# Patient Record
Sex: MALE | Race: WHITE | NOT HISPANIC OR LATINO | URBAN - METROPOLITAN AREA
[De-identification: names, ages, dates, MRNs, and addresses within clinical notes are randomized per-mention and may not be internally consistent; named-entity substitution may affect disease eponyms.]

---

## 2021-11-14 ENCOUNTER — EMERGENCY (EMERGENCY)
Facility: HOSPITAL | Age: 51
LOS: 1 days | Discharge: HOME | End: 2021-11-14
Attending: EMERGENCY MEDICINE | Admitting: EMERGENCY MEDICINE
Payer: COMMERCIAL

## 2021-11-14 VITALS
HEART RATE: 82 BPM | TEMPERATURE: 98 F | SYSTOLIC BLOOD PRESSURE: 177 MMHG | DIASTOLIC BLOOD PRESSURE: 97 MMHG | HEIGHT: 75 IN | RESPIRATION RATE: 20 BRPM | WEIGHT: 220.02 LBS | OXYGEN SATURATION: 99 %

## 2021-11-14 VITALS
HEART RATE: 80 BPM | TEMPERATURE: 98 F | OXYGEN SATURATION: 99 % | DIASTOLIC BLOOD PRESSURE: 80 MMHG | SYSTOLIC BLOOD PRESSURE: 128 MMHG | RESPIRATION RATE: 18 BRPM

## 2021-11-14 DIAGNOSIS — Z20.822 CONTACT WITH AND (SUSPECTED) EXPOSURE TO COVID-19: ICD-10-CM

## 2021-11-14 DIAGNOSIS — N13.2 HYDRONEPHROSIS WITH RENAL AND URETERAL CALCULOUS OBSTRUCTION: ICD-10-CM

## 2021-11-14 DIAGNOSIS — R10.9 UNSPECIFIED ABDOMINAL PAIN: ICD-10-CM

## 2021-11-14 DIAGNOSIS — R11.2 NAUSEA WITH VOMITING, UNSPECIFIED: ICD-10-CM

## 2021-11-14 LAB
ALBUMIN SERPL ELPH-MCNC: 4.7 G/DL — SIGNIFICANT CHANGE UP (ref 3.3–5)
ALP SERPL-CCNC: 84 U/L — SIGNIFICANT CHANGE UP (ref 40–120)
ALT FLD-CCNC: 33 U/L — SIGNIFICANT CHANGE UP (ref 10–45)
ANION GAP SERPL CALC-SCNC: 12 MMOL/L — SIGNIFICANT CHANGE UP (ref 5–17)
APPEARANCE UR: CLEAR — SIGNIFICANT CHANGE UP
AST SERPL-CCNC: 20 U/L — SIGNIFICANT CHANGE UP (ref 10–40)
BASOPHILS # BLD AUTO: 0.02 K/UL — SIGNIFICANT CHANGE UP (ref 0–0.2)
BASOPHILS NFR BLD AUTO: 0.2 % — SIGNIFICANT CHANGE UP (ref 0–2)
BILIRUB SERPL-MCNC: 0.3 MG/DL — SIGNIFICANT CHANGE UP (ref 0.2–1.2)
BILIRUB UR-MCNC: ABNORMAL
BUN SERPL-MCNC: 13 MG/DL — SIGNIFICANT CHANGE UP (ref 7–23)
CALCIUM SERPL-MCNC: 9.7 MG/DL — SIGNIFICANT CHANGE UP (ref 8.4–10.5)
CHLORIDE SERPL-SCNC: 110 MMOL/L — HIGH (ref 96–108)
CO2 SERPL-SCNC: 20 MMOL/L — LOW (ref 22–31)
COLOR SPEC: YELLOW — SIGNIFICANT CHANGE UP
CREAT SERPL-MCNC: 1.18 MG/DL — SIGNIFICANT CHANGE UP (ref 0.5–1.3)
DIFF PNL FLD: ABNORMAL
EOSINOPHIL # BLD AUTO: 0.01 K/UL — SIGNIFICANT CHANGE UP (ref 0–0.5)
EOSINOPHIL NFR BLD AUTO: 0.1 % — SIGNIFICANT CHANGE UP (ref 0–6)
GLUCOSE SERPL-MCNC: 149 MG/DL — HIGH (ref 70–99)
GLUCOSE UR QL: NEGATIVE — SIGNIFICANT CHANGE UP
HCT VFR BLD CALC: 48.2 % — SIGNIFICANT CHANGE UP (ref 39–50)
HGB BLD-MCNC: 16.9 G/DL — SIGNIFICANT CHANGE UP (ref 13–17)
IMM GRANULOCYTES NFR BLD AUTO: 0.4 % — SIGNIFICANT CHANGE UP (ref 0–1.5)
KETONES UR-MCNC: NEGATIVE — SIGNIFICANT CHANGE UP
LEUKOCYTE ESTERASE UR-ACNC: NEGATIVE — SIGNIFICANT CHANGE UP
LIDOCAIN IGE QN: 37 U/L — SIGNIFICANT CHANGE UP (ref 7–60)
LYMPHOCYTES # BLD AUTO: 0.73 K/UL — LOW (ref 1–3.3)
LYMPHOCYTES # BLD AUTO: 5.9 % — LOW (ref 13–44)
MCHC RBC-ENTMCNC: 31.5 PG — SIGNIFICANT CHANGE UP (ref 27–34)
MCHC RBC-ENTMCNC: 35.1 GM/DL — SIGNIFICANT CHANGE UP (ref 32–36)
MCV RBC AUTO: 89.8 FL — SIGNIFICANT CHANGE UP (ref 80–100)
MONOCYTES # BLD AUTO: 0.24 K/UL — SIGNIFICANT CHANGE UP (ref 0–0.9)
MONOCYTES NFR BLD AUTO: 1.9 % — LOW (ref 2–14)
NEUTROPHILS # BLD AUTO: 11.29 K/UL — HIGH (ref 1.8–7.4)
NEUTROPHILS NFR BLD AUTO: 91.5 % — HIGH (ref 43–77)
NITRITE UR-MCNC: NEGATIVE — SIGNIFICANT CHANGE UP
NRBC # BLD: 0 /100 WBCS — SIGNIFICANT CHANGE UP (ref 0–0)
PH UR: 7 — SIGNIFICANT CHANGE UP (ref 5–8)
PLATELET # BLD AUTO: 295 K/UL — SIGNIFICANT CHANGE UP (ref 150–400)
POTASSIUM SERPL-MCNC: 4.8 MMOL/L — SIGNIFICANT CHANGE UP (ref 3.5–5.3)
POTASSIUM SERPL-SCNC: 4.8 MMOL/L — SIGNIFICANT CHANGE UP (ref 3.5–5.3)
PROT SERPL-MCNC: 7.9 G/DL — SIGNIFICANT CHANGE UP (ref 6–8.3)
PROT UR-MCNC: 30 MG/DL
RBC # BLD: 5.37 M/UL — SIGNIFICANT CHANGE UP (ref 4.2–5.8)
RBC # FLD: 13.3 % — SIGNIFICANT CHANGE UP (ref 10.3–14.5)
SARS-COV-2 RNA SPEC QL NAA+PROBE: NEGATIVE — SIGNIFICANT CHANGE UP
SODIUM SERPL-SCNC: 142 MMOL/L — SIGNIFICANT CHANGE UP (ref 135–145)
SP GR SPEC: 1.01 — SIGNIFICANT CHANGE UP (ref 1–1.03)
UROBILINOGEN FLD QL: 0.2 E.U./DL — SIGNIFICANT CHANGE UP
WBC # BLD: 12.34 K/UL — HIGH (ref 3.8–10.5)
WBC # FLD AUTO: 12.34 K/UL — HIGH (ref 3.8–10.5)

## 2021-11-14 PROCEDURE — 96374 THER/PROPH/DIAG INJ IV PUSH: CPT | Mod: XU

## 2021-11-14 PROCEDURE — 99285 EMERGENCY DEPT VISIT HI MDM: CPT

## 2021-11-14 PROCEDURE — 87635 SARS-COV-2 COVID-19 AMP PRB: CPT

## 2021-11-14 PROCEDURE — 36415 COLL VENOUS BLD VENIPUNCTURE: CPT

## 2021-11-14 PROCEDURE — 83690 ASSAY OF LIPASE: CPT

## 2021-11-14 PROCEDURE — 80053 COMPREHEN METABOLIC PANEL: CPT

## 2021-11-14 PROCEDURE — 81001 URINALYSIS AUTO W/SCOPE: CPT

## 2021-11-14 PROCEDURE — 85025 COMPLETE CBC W/AUTO DIFF WBC: CPT

## 2021-11-14 PROCEDURE — G1004: CPT

## 2021-11-14 PROCEDURE — 74177 CT ABD & PELVIS W/CONTRAST: CPT | Mod: 26,ME

## 2021-11-14 PROCEDURE — 96375 TX/PRO/DX INJ NEW DRUG ADDON: CPT

## 2021-11-14 PROCEDURE — 99284 EMERGENCY DEPT VISIT MOD MDM: CPT | Mod: 25

## 2021-11-14 PROCEDURE — 74177 CT ABD & PELVIS W/CONTRAST: CPT | Mod: ME

## 2021-11-14 RX ORDER — IBUPROFEN 200 MG
1 TABLET ORAL
Qty: 30 | Refills: 0
Start: 2021-11-14

## 2021-11-14 RX ORDER — KETOROLAC TROMETHAMINE 30 MG/ML
15 SYRINGE (ML) INJECTION ONCE
Refills: 0 | Status: DISCONTINUED | OUTPATIENT
Start: 2021-11-14 | End: 2021-11-14

## 2021-11-14 RX ORDER — DIPHENHYDRAMINE HCL 50 MG
25 CAPSULE ORAL ONCE
Refills: 0 | Status: COMPLETED | OUTPATIENT
Start: 2021-11-14 | End: 2021-11-14

## 2021-11-14 RX ORDER — METOCLOPRAMIDE HCL 10 MG
10 TABLET ORAL ONCE
Refills: 0 | Status: COMPLETED | OUTPATIENT
Start: 2021-11-14 | End: 2021-11-14

## 2021-11-14 RX ORDER — ONDANSETRON 8 MG/1
1 TABLET, FILM COATED ORAL
Qty: 12 | Refills: 0
Start: 2021-11-14

## 2021-11-14 RX ORDER — SODIUM CHLORIDE 9 MG/ML
1000 INJECTION INTRAMUSCULAR; INTRAVENOUS; SUBCUTANEOUS ONCE
Refills: 0 | Status: COMPLETED | OUTPATIENT
Start: 2021-11-14 | End: 2021-11-14

## 2021-11-14 RX ORDER — ONDANSETRON 8 MG/1
4 TABLET, FILM COATED ORAL ONCE
Refills: 0 | Status: COMPLETED | OUTPATIENT
Start: 2021-11-14 | End: 2021-11-14

## 2021-11-14 RX ORDER — IOHEXOL 300 MG/ML
30 INJECTION, SOLUTION INTRAVENOUS ONCE
Refills: 0 | Status: COMPLETED | OUTPATIENT
Start: 2021-11-14 | End: 2021-11-14

## 2021-11-14 RX ORDER — TAMSULOSIN HYDROCHLORIDE 0.4 MG/1
1 CAPSULE ORAL
Qty: 7 | Refills: 0
Start: 2021-11-14 | End: 2021-11-20

## 2021-11-14 RX ADMIN — Medication 15 MILLIGRAM(S): at 14:45

## 2021-11-14 RX ADMIN — IOHEXOL 30 MILLILITER(S): 300 INJECTION, SOLUTION INTRAVENOUS at 14:29

## 2021-11-14 RX ADMIN — Medication 10 MILLIGRAM(S): at 14:55

## 2021-11-14 RX ADMIN — Medication 15 MILLIGRAM(S): at 14:26

## 2021-11-14 RX ADMIN — Medication 25 MILLIGRAM(S): at 14:56

## 2021-11-14 RX ADMIN — SODIUM CHLORIDE 1000 MILLILITER(S): 9 INJECTION INTRAMUSCULAR; INTRAVENOUS; SUBCUTANEOUS at 14:27

## 2021-11-14 RX ADMIN — ONDANSETRON 4 MILLIGRAM(S): 8 TABLET, FILM COATED ORAL at 14:27

## 2021-11-14 NOTE — ED PROVIDER NOTE - CLINICAL SUMMARY MEDICAL DECISION MAKING FREE TEXT BOX
right sided abd pain and nausea/vomiting today. sent from  to r/o appendicitis. tender to touch but more uncomfortable than usual for appy. labs/ct ordered to eval appendicitis, renal stone, diverticulitis, abscess, obstruction. given zofran/toradol/ivf. some persistent nausea so reglan given with improvement. ct with 4mm stone. creatinine normal, no infection in urine. given pain controlled, will dc with ibuprofen/percocet/flomax/zofran. urology referral. return precautions discussed

## 2021-11-14 NOTE — ED PROVIDER NOTE - NSFOLLOWUPINSTRUCTIONS_ED_ALL_ED_FT
Take ibuprofen 600mg every 6 hours until stone passes. For continued pain, take oxycodone in addition. Do not take narcotics (percocet) and drive or operate machinery. Please see referral to urologist.  Call for appointment.  If you have any problems with followup, please call the ED Referral Coordinator at [phone].  Return to the ER if symptoms worsen or other concerns.      Kidney Stones    WHAT YOU NEED TO KNOW:    Kidney stones form in the urinary system when the water and waste in your urine are out of balance. When this happens, certain types of waste crystals separate from the urine. The crystals build up and form kidney stones. You may have more than one kidney stone.     Kidney Stones          DISCHARGE INSTRUCTIONS:    Return to the emergency department if:   •You have vomiting that is not relieved by medicine.          Contact your healthcare provider if:   •You have a fever.       •You have trouble passing urine.      •You see blood in your urine.      •You have severe pain.      •You have any questions or concerns about your condition or care.      Medicines:   •NSAIDs, such as ibuprofen, help decrease swelling, pain, and fever. This medicine is available with or without a doctor's order. NSAIDs can cause stomach bleeding or kidney problems in certain people. If you take blood thinner medicine, always ask your healthcare provider if NSAIDs are safe for you. Always read the medicine label and follow directions.      •Prescription pain medicine may be given. Ask your healthcare provider how to take this medicine safely. Some prescription pain medicines contain acetaminophen. Do not take other medicines that contain acetaminophen without talking to your healthcare provider. Too much acetaminophen may cause liver damage. Prescription pain medicine may cause constipation. Ask your healthcare provider how to prevent or treat constipation.       •Medicines to balance your electrolytes may be needed.       •Take your medicine as directed. Contact your healthcare provider if you think your medicine is not helping or if you have side effects. Tell him or her if you are allergic to any medicine. Keep a list of the medicines, vitamins, and herbs you take. Include the amounts, and when and why you take them. Bring the list or the pill bottles to follow-up visits. Carry your medicine list with you in case of an emergency.      Follow up with your healthcare provider as directed: You may need to return for more tests. Write down your questions so you remember to ask them during your visits.    What you can do to manage kidney stones:   •Drink more liquids. Your healthcare provider may tell you to drink at least 8 to 12 (eight-ounce) cups of liquids each day. This helps flush out the kidney stones when you urinate. Water is the best liquid to drink.      •Strain your urine every time you go to the bathroom. Urinate through a strainer or a piece of thin cloth to catch the stones. Take the stones to your healthcare provider so they can be sent to the lab for tests. This will help your healthcare providers plan the best treatment for you.  Look for Stones in the Filter           •Eat a variety of healthy foods. Healthy foods include fruits, vegetables, whole-grain breads, low-fat dairy products, beans, and fish. You may need to limit how much sodium (salt) or protein you eat. Ask for information about the best foods for you.      •Stay active. Your stones may pass more easily if you stay active. Exercise can also help you manage your weight. Ask about the best activities for you.      After you pass the kidney stones: Your healthcare provider may order a 24-hour urine test. Results from a 24-hour urine test will help your healthcare provider plan ways to prevent more stones from forming. Your healthcare provider will give you more instructions.

## 2021-11-14 NOTE — ED ADULT TRIAGE NOTE - CHIEF COMPLAINT QUOTE
Patient reporting mid and right sided abd pain that began this morning, associated with vomiting - last episode 11am today.  Patient was seen in urgent care and recommended to the ED to r/o appendicitis.

## 2021-11-14 NOTE — ED PROVIDER NOTE - PATIENT PORTAL LINK FT
You can access the FollowMyHealth Patient Portal offered by [facility] by registering at the following website: [email or web address]. By joining [facility]’s FollowMyHealth portal, you will also be able to view your health information using other applications (apps) compatible with our system.

## 2021-11-14 NOTE — ED ADULT NURSE NOTE - OBJECTIVE STATEMENT
Pt AOX4. Pt c/o stabbing RUQ pain and nausea that started this morning at 11am. Pt sent at  and referred fro r/o appendicitis. Pt denies fevers, chills, SOB, chest pain. Pt speaking in full complete sentences. Respirations even and unlabored. Pt presents guarding RUQ c/o 10/10 pain.

## 2021-11-14 NOTE — ED PROVIDER NOTE - CARE PROVIDER_API CALL
Rafiq Milan)  Urology  [address], [address]  [place], [place] [id number]  Phone: [phone]  Fax: [phone]  Follow Up Time:

## 2021-11-14 NOTE — ED PROVIDER NOTE - OBJECTIVE STATEMENT
history of migraines here with abdominal pain, nausea/vomiting since this am. Reports initially started with bloating then pain. Mid/right side of abdomen, more lower. Denies fever, chills, diarrhea, flank pain. Pain was 6/10, now 10/10. Went to urgent care and told to go to ER to r/o appendicitis. No prior surgeries. Tried metamucil without relief.

## 2021-11-15 PROBLEM — Z00.00 ENCOUNTER FOR PREVENTIVE HEALTH EXAMINATION: Status: ACTIVE | Noted: 2021-11-15

## 2021-11-15 PROBLEM — Z78.9 OTHER SPECIFIED HEALTH STATUS: Chronic | Status: ACTIVE | Noted: 2021-11-14

## 2021-11-16 ENCOUNTER — NON-APPOINTMENT (OUTPATIENT)
Age: 51
End: 2021-11-16

## 2021-11-18 ENCOUNTER — APPOINTMENT (OUTPATIENT)
Age: 51
End: 2021-11-18
Payer: COMMERCIAL

## 2021-11-18 VITALS
TEMPERATURE: 98.3 F | SYSTOLIC BLOOD PRESSURE: 144 MMHG | WEIGHT: 210 LBS | HEIGHT: 75 IN | OXYGEN SATURATION: 98 % | BODY MASS INDEX: 26.11 KG/M2 | DIASTOLIC BLOOD PRESSURE: 87 MMHG

## 2021-11-18 DIAGNOSIS — N20.1 CALCULUS OF URETER: ICD-10-CM

## 2021-11-18 DIAGNOSIS — F17.200 NICOTINE DEPENDENCE, UNSPECIFIED, UNCOMPLICATED: ICD-10-CM

## 2021-11-18 PROCEDURE — 99204 OFFICE O/P NEW MOD 45 MIN: CPT

## 2021-11-18 RX ORDER — TOPIRAMATE 100 MG/1
100 TABLET, FILM COATED ORAL
Qty: 120 | Refills: 0 | Status: ACTIVE | COMMUNITY
Start: 2021-09-08

## 2021-11-18 RX ORDER — TAMSULOSIN HYDROCHLORIDE 0.4 MG/1
0.4 CAPSULE ORAL
Qty: 7 | Refills: 0 | Status: ACTIVE | COMMUNITY
Start: 2021-11-14

## 2021-11-18 RX ORDER — IBUPROFEN 600 MG/1
600 TABLET, FILM COATED ORAL
Qty: 30 | Refills: 0 | Status: ACTIVE | COMMUNITY
Start: 2021-11-14

## 2021-11-18 RX ORDER — SUMATRIPTAN 100 MG/1
100 TABLET, FILM COATED ORAL
Qty: 9 | Refills: 0 | Status: ACTIVE | COMMUNITY
Start: 2021-09-08

## 2021-11-18 RX ORDER — OXYCODONE AND ACETAMINOPHEN 5; 325 MG/1; MG/1
5-325 TABLET ORAL
Qty: 15 | Refills: 0 | Status: ACTIVE | COMMUNITY
Start: 2021-11-14

## 2021-11-18 RX ORDER — ONDANSETRON 4 MG/1
4 TABLET, ORALLY DISINTEGRATING ORAL
Qty: 12 | Refills: 0 | Status: ACTIVE | COMMUNITY
Start: 2021-11-14

## 2021-11-18 RX ORDER — TAMSULOSIN HYDROCHLORIDE 0.4 MG/1
0.4 CAPSULE ORAL
Qty: 30 | Refills: 3 | Status: ACTIVE | COMMUNITY
Start: 2021-11-18 | End: 1900-01-01

## 2021-11-18 NOTE — PHYSICAL EXAM
[General Appearance - Well Developed] : well developed [General Appearance - Well Nourished] : well nourished [Normal Appearance] : normal appearance [Well Groomed] : well groomed [General Appearance - In No Acute Distress] : no acute distress [Edema] : no peripheral edema [Respiration, Rhythm And Depth] : normal respiratory rhythm and effort [Exaggerated Use Of Accessory Muscles For Inspiration] : no accessory muscle use [Testes Mass (___cm)] : there were no testicular masses [Normal Station and Gait] : the gait and station were normal for the patient's age [] : no rash [No Focal Deficits] : no focal deficits [Oriented To Time, Place, And Person] : oriented to person, place, and time [Affect] : the affect was normal [Mood] : the mood was normal [Not Anxious] : not anxious [No Palpable Adenopathy] : no palpable adenopathy

## 2021-11-18 NOTE — ASSESSMENT
[FreeTextEntry1] : Plan for medical expulsive therapy\par tamsulosin at this time\par renal bladder US in 4 weeks

## 2021-11-18 NOTE — HISTORY OF PRESENT ILLNESS
[FreeTextEntry1] : GIO MERINO  is a 51 year old male who presents to establish care after being seen in the ER for kidney stones. \par PCP is Armando Marie\par \par 11/14 he went to the ER with abdominal pain, bloating and N/V. He was diagnosed with Moderate right hydronephroureter with 4 mm mid ureteral stone and was discharged home.  11/16 developed nausea, severe pain 8/10, with chills. No fever. Took ibuprofen, Zofran & oxycodone. Pain subsided. \par Today he reports nausea without vomiting and left sided pressure. He thinks he may have passed the stone yesterday, saw "3 little specs" in the toilet after urinating. \par Denies dysuria, gross hematuria, frequency, urgency.  \par Never had kidney stone. Mother's cousin had kidney stones. Current smoker - 1 pack per day x30 years. \par \par 11/14/2021 CT abd/pelvis: Moderate right hydronephroureter with 4 mm mid ureteral stone. Nonobstructing renal stones. Two nonobstructing renal stones in the right interpolar and lower pole kidney, 3 mm.\par \par \par \par \par

## 2021-12-28 ENCOUNTER — APPOINTMENT (OUTPATIENT)
Dept: UROLOGY | Facility: CLINIC | Age: 51
End: 2021-12-28
Payer: COMMERCIAL

## 2021-12-28 VITALS
OXYGEN SATURATION: 98 % | SYSTOLIC BLOOD PRESSURE: 133 MMHG | TEMPERATURE: 98.3 F | HEART RATE: 88 BPM | DIASTOLIC BLOOD PRESSURE: 80 MMHG

## 2021-12-28 PROCEDURE — 99212 OFFICE O/P EST SF 10 MIN: CPT

## 2021-12-28 PROCEDURE — 76775 US EXAM ABDO BACK WALL LIM: CPT

## 2021-12-28 NOTE — PHYSICAL EXAM
[General Appearance - Well Developed] : well developed [General Appearance - Well Nourished] : well nourished [Normal Appearance] : normal appearance [Well Groomed] : well groomed [General Appearance - In No Acute Distress] : no acute distress [] : no respiratory distress [Respiration, Rhythm And Depth] : normal respiratory rhythm and effort [Exaggerated Use Of Accessory Muscles For Inspiration] : no accessory muscle use [Abdomen Soft] : soft [Abdomen Tenderness] : non-tender [Costovertebral Angle Tenderness] : no ~M costovertebral angle tenderness [Normal Station and Gait] : the gait and station were normal for the patient's age

## 2021-12-28 NOTE — ASSESSMENT
[FreeTextEntry1] : 51 year old male on medical expulsive therapy for 4 mm right mid ureteral stone, now with US showing stone has passed\par -discussed importance of hydration, target UO 2-2.5L day\par -reviewed s/s to monitor for and when to call office\par -no family history of prostate cancer, can start screening at age 55\par \par \par RTC in 6 months with Dr. Campo and US in office

## 2021-12-28 NOTE — LETTER BODY
[Dear  ___] : Dear  [unfilled], [Consult Letter:] : I had the pleasure of evaluating your patient, [unfilled]. [Sincerely,] : Sincerely, [FreeTextEntry3] : Rafiq Milan MD, FRCS \par  of Urology [facility]\par Director of Laparoscopic and Robotic Surgery \par [facility] Director of Urology, [facility] \par Professor of Urology\par \par (Office) [phone]\par (Cell) [phone] \par [email or web address]\par

## 2021-12-28 NOTE — HISTORY OF PRESENT ILLNESS
[FreeTextEntry1] : Dr. Armando Marie\par [address]\[place] [address]\par \par Mr. Haque returns today for follow up after being diagnosed with 4 mm mid ureteral stone and moderate right hydronephroureter.  He was on medical expulsive therapy.  \par \par Doing well today.   No flank pain, no hematuria, no irritative voiding system\par No obstructing stones seen on US in office and no hydronephrosis.\par Off Tamsulosin, voiding without difficulty.\par \par \par CT from November 2021 also noted two nonobstructing renal stones in he right interpole and lower pole left kidney measuring 3 mm.\par

## 2022-06-03 ENCOUNTER — APPOINTMENT (OUTPATIENT)
Dept: UROLOGY | Facility: CLINIC | Age: 52
End: 2022-06-03

## 2022-07-01 ENCOUNTER — APPOINTMENT (OUTPATIENT)
Dept: UROLOGY | Facility: CLINIC | Age: 52
End: 2022-07-01

## 2022-07-01 VITALS
TEMPERATURE: 97.3 F | SYSTOLIC BLOOD PRESSURE: 124 MMHG | HEART RATE: 62 BPM | DIASTOLIC BLOOD PRESSURE: 89 MMHG | OXYGEN SATURATION: 98 %

## 2022-07-01 DIAGNOSIS — N20.0 CALCULUS OF KIDNEY: ICD-10-CM

## 2022-07-01 PROCEDURE — 99213 OFFICE O/P EST LOW 20 MIN: CPT

## 2022-07-01 PROCEDURE — 76775 US EXAM ABDO BACK WALL LIM: CPT

## 2022-07-01 RX ORDER — ONDANSETRON 4 MG/1
4 TABLET, ORALLY DISINTEGRATING ORAL EVERY 8 HOURS
Qty: 12 | Refills: 0 | Status: ACTIVE | COMMUNITY
Start: 2022-07-01 | End: 1900-01-01

## 2022-07-01 RX ORDER — HYDROCODONE BITARTRATE AND ACETAMINOPHEN 5; 300 MG/1; MG/1
5-300 TABLET ORAL EVERY 6 HOURS
Qty: 5 | Refills: 0 | Status: ACTIVE | COMMUNITY
Start: 2022-07-01 | End: 1900-01-01

## 2022-07-01 RX ORDER — IBUPROFEN 800 MG/1
800 TABLET, FILM COATED ORAL EVERY 8 HOURS
Qty: 12 | Refills: 0 | Status: ACTIVE | COMMUNITY
Start: 2022-07-01 | End: 1900-01-01

## 2022-07-01 NOTE — HISTORY OF PRESENT ILLNESS
[FreeTextEntry1] : Name GIO MERINO \par MRN [id number] \par  Sep  2 1970 \par ------------------------------------------------------------------------------------------------------------------------------------------- \par Date of First Visit: 2022\par Referring Provider/PCP: Dr. Milan / Armando May MD\par ------------------------------------------------------------------------------------------------------------------------------------------- \par CC: kidney stones \par  \par History of Present Illness: GIO MERINO is a 51 year M who presents for evaluation of kidney stones. He saw Dr. Milan in 2021 for follow up after passing a 4mm right ureteral stone.  He presents today for renal US and to discuss prevention.  He believes he passed a stone about one month ago.  He experienced right sided abdominal pain and nausea.  Symptoms were managed with Zofran, Tamsulosin, Ibuprofen and Vicodin.  Shortly after he noticed the stone in his urine. Today he feels well. Denies fever, chills, nausea, vomiting, pain, urinary complaints.\par \par Renal US completed in the office today demonstrates: No stones. No hydronephrosis on the right side, left pelvic fullness \par  \par Imaging: CT scan from 2021 can be found in [facility] PACS. Findings:  There is a right moderate hydroureteronephrosis with 4 mm x 4 mm obstructing stone in the right mid ureter. Two nonobstructing renal stones in the right interpolar and lower pole kidney, 3 mm.\par \par Previous urine cultures:  None \par  \par Kidney Stone History:  \par First-time stone former - Yes (2021)\par Concurrent asymptomatic stone(s) - Yes\par Previous stone surgeries - None \par Previous passed stones - Yes\par Comorbidities - Non-contributory \par Family history of kidney stones - Distant relatives \par Previous metabolic evaluation - None

## 2022-07-01 NOTE — ASSESSMENT
[FreeTextEntry1] : Assessment:  \par  \par GIO MERINO is a 51 year M with a recently passed right ureteral stone. Last imaging demonstrated two small right sided stones which were not seen on ultrasound today. Has now passed 2 stones on the right with no known stones on the left. Presumably has one remaining 3 mm stone in the right kidney, unable to be seen with US. \par \par We discussed management options, including observation/surveillance, shock wave lithotripsy, and ureteroscopy - including the benefit and risks of each. Patient prefers surveillance. \par  \par We discussed generalized stone prevention strategies, metabolic evaluation (serum chemistry profile and 24-hour urine collection +/- PTH testing if serum Ca is elevated), and the natural history of kidney stone disease. I explained that first-time stone formers generally do not need a complete metabolic work-up in the absence of risk factors. However, without behavioral and dietary modification, they are at a heightened risk of developing future symptomatic stones: 10% at 2 years, 20% at 5 years, and 30% at 10 years ([facility] data). In recurrent stone formers, the risk of recurrence is considerably higher with a lifetime recurrence rate of 60-80%. Risk factors include stone type, total stone volume, family history, multiple stones, and many medical comorbidities (DM, HTN, HLD, obesity, gout, HPT).  \par   \par Generalized stone prevention counseling was provided as follows:  \par 1. High fluid intake. The goal should be to drink enough to produce 2.5 liters (quarts) of urine daily. Most studies have shown that high fluid volume intake will decrease the risk of stone formation. Research generally indicates that there appears to be little difference between hard and soft water in the formation of kidney stones. Lemon juice added to the water may also aid with increasing urine pH, urine citric acid and reducing stone formation.  \par   \par 2. Dietary recommendations. The key to all dietary recommendations is moderation.  \par · Decrease animal protein consumption. High protein intake increases urinary calcium, oxalate, and uric acid excretion into the urine - all of which will increase the probability of stone formation.  \par · Decrease sodium intake by avoiding heavily salted foods, and resist adding salt to food at the table. Sodium restriction is widely recognized as an important element of dietary prevention of recurrent kidney stones.  \par · Dietary calcium. Patient should consume a daily recommended allowance of dietary calcium (800-1200 mg). Patients who take in too much Ca or too little Ca are at an increased risk of recurrent stone formation. Dietary calcium is preferable to supplements. Calcium supplementation can be safe when the calcium is taken with meals, rather than at bedtime. Another suggestion for patients who have been recommended to take calcium supplements for their bone health is to consider using calcium citrate, an over-the-counter preparation that provides 950 mg of calcium citrate and 200 mg of elemental calcium in each tablet.  \par · Avoid excess intake of foods with high oxalate content, including dark leafy greens, beets, nuts, tea, coffee, and chocolate. Strict avoidance of oxalate is not necessary in most cases.  \par · Avoid high doses of vitamin C. Intake should be limited to a maximum daily dose of less than 2 gm (2,000 mg).  \par  \par  \par Plan:  \par -Follow up visit in 1 year with renal ultrasound  \par -Litholink ordered  \par -Refilled Zofran, Ibuprofen, in the event he passes another stone\par \par \par Nazario Campo MD\par Director, Endourology and [facility]\par [facility]\par  of Urology\par [facility] [employer] [facility]\par

## 2022-09-22 NOTE — ED PROVIDER NOTE - IV ALTEPLASE INCLUSION HIDDEN
[de-identified] : Timing and frequency of medication has been discussed with patient.\par I have consulted the  registry for the purpose of reviewing the patients controlled substance.\par 
show

## 2023-08-02 ENCOUNTER — APPOINTMENT (OUTPATIENT)
Dept: UROLOGY | Facility: CLINIC | Age: 53
End: 2023-08-02